# Patient Record
Sex: FEMALE | Race: BLACK OR AFRICAN AMERICAN | NOT HISPANIC OR LATINO | Employment: FULL TIME | RURAL
[De-identification: names, ages, dates, MRNs, and addresses within clinical notes are randomized per-mention and may not be internally consistent; named-entity substitution may affect disease eponyms.]

---

## 2020-04-30 ENCOUNTER — OFFICE VISIT (OUTPATIENT)
Dept: FAMILY MEDICINE CLINIC | Facility: CLINIC | Age: 40
End: 2020-04-30

## 2020-04-30 VITALS
HEART RATE: 73 BPM | SYSTOLIC BLOOD PRESSURE: 124 MMHG | BODY MASS INDEX: 30.09 KG/M2 | HEIGHT: 65 IN | OXYGEN SATURATION: 98 % | WEIGHT: 180.6 LBS | TEMPERATURE: 97.9 F | DIASTOLIC BLOOD PRESSURE: 82 MMHG

## 2020-04-30 DIAGNOSIS — I10 ESSENTIAL HYPERTENSION: Primary | ICD-10-CM

## 2020-04-30 DIAGNOSIS — Z13.220 SCREENING FOR LIPOID DISORDERS: ICD-10-CM

## 2020-04-30 DIAGNOSIS — Z13.1 SCREENING FOR DIABETES MELLITUS: ICD-10-CM

## 2020-04-30 DIAGNOSIS — Z13.29 SCREENING FOR THYROID DISORDER: ICD-10-CM

## 2020-04-30 PROCEDURE — 99203 OFFICE O/P NEW LOW 30 MIN: CPT | Performed by: NURSE PRACTITIONER

## 2020-04-30 RX ORDER — LOSARTAN POTASSIUM 25 MG/1
12.5 TABLET ORAL DAILY
Qty: 45 TABLET | Refills: 1 | Status: SHIPPED | OUTPATIENT
Start: 2020-04-30 | End: 2020-10-29

## 2020-04-30 RX ORDER — ACETAMINOPHEN AND CODEINE PHOSPHATE 120; 12 MG/5ML; MG/5ML
1 SOLUTION ORAL DAILY
COMMUNITY
Start: 2020-04-02 | End: 2022-05-24 | Stop reason: SDUPTHER

## 2020-04-30 NOTE — PATIENT INSTRUCTIONS
"Hypertension, Adult  High blood pressure (hypertension) is when the force of blood pumping through the arteries is too strong. The arteries are the blood vessels that carry blood from the heart throughout the body. Hypertension forces the heart to work harder to pump blood and may cause arteries to become narrow or stiff. Untreated or uncontrolled hypertension can cause a heart attack, heart failure, a stroke, kidney disease, and other problems.  A blood pressure reading consists of a higher number over a lower number. Ideally, your blood pressure should be below 120/80. The first (\"top\") number is called the systolic pressure. It is a measure of the pressure in your arteries as your heart beats. The second (\"bottom\") number is called the diastolic pressure. It is a measure of the pressure in your arteries as the heart relaxes.  What are the causes?  The exact cause of this condition is not known. There are some conditions that result in or are related to high blood pressure.  What increases the risk?  Some risk factors for high blood pressure are under your control. The following factors may make you more likely to develop this condition:  · Smoking.  · Having type 2 diabetes mellitus, high cholesterol, or both.  · Not getting enough exercise or physical activity.  · Being overweight.  · Having too much fat, sugar, calories, or salt (sodium) in your diet.  · Drinking too much alcohol.  Some risk factors for high blood pressure may be difficult or impossible to change. Some of these factors include:  · Having chronic kidney disease.  · Having a family history of high blood pressure.  · Age. Risk increases with age.  · Race. You may be at higher risk if you are .  · Gender. Men are at higher risk than women before age 45. After age 65, women are at higher risk than men.  · Having obstructive sleep apnea.  · Stress.  What are the signs or symptoms?  High blood pressure may not cause symptoms. Very high " blood pressure (hypertensive crisis) may cause:  · Headache.  · Anxiety.  · Shortness of breath.  · Nosebleed.  · Nausea and vomiting.  · Vision changes.  · Severe chest pain.  · Seizures.  How is this diagnosed?  This condition is diagnosed by measuring your blood pressure while you are seated, with your arm resting on a flat surface, your legs uncrossed, and your feet flat on the floor. The cuff of the blood pressure monitor will be placed directly against the skin of your upper arm at the level of your heart. It should be measured at least twice using the same arm. Certain conditions can cause a difference in blood pressure between your right and left arms.  Certain factors can cause blood pressure readings to be lower or higher than normal for a short period of time:  · When your blood pressure is higher when you are in a health care provider's office than when you are at home, this is called white coat hypertension. Most people with this condition do not need medicines.  · When your blood pressure is higher at home than when you are in a health care provider's office, this is called masked hypertension. Most people with this condition may need medicines to control blood pressure.  If you have a high blood pressure reading during one visit or you have normal blood pressure with other risk factors, you may be asked to:  · Return on a different day to have your blood pressure checked again.  · Monitor your blood pressure at home for 1 week or longer.  If you are diagnosed with hypertension, you may have other blood or imaging tests to help your health care provider understand your overall risk for other conditions.  How is this treated?  This condition is treated by making healthy lifestyle changes, such as eating healthy foods, exercising more, and reducing your alcohol intake. Your health care provider may prescribe medicine if lifestyle changes are not enough to get your blood pressure under control, and  if:  · Your systolic blood pressure is above 130.  · Your diastolic blood pressure is above 80.  Your personal target blood pressure may vary depending on your medical conditions, your age, and other factors.  Follow these instructions at home:  Eating and drinking    · Eat a diet that is high in fiber and potassium, and low in sodium, added sugar, and fat. An example eating plan is called the DASH (Dietary Approaches to Stop Hypertension) diet. To eat this way:  ? Eat plenty of fresh fruits and vegetables. Try to fill one half of your plate at each meal with fruits and vegetables.  ? Eat whole grains, such as whole-wheat pasta, brown rice, or whole-grain bread. Fill about one fourth of your plate with whole grains.  ? Eat or drink low-fat dairy products, such as skim milk or low-fat yogurt.  ? Avoid fatty cuts of meat, processed or cured meats, and poultry with skin. Fill about one fourth of your plate with lean proteins, such as fish, chicken without skin, beans, eggs, or tofu.  ? Avoid pre-made and processed foods. These tend to be higher in sodium, added sugar, and fat.  · Reduce your daily sodium intake. Most people with hypertension should eat less than 1,500 mg of sodium a day.  · Do not drink alcohol if:  ? Your health care provider tells you not to drink.  ? You are pregnant, may be pregnant, or are planning to become pregnant.  · If you drink alcohol:  ? Limit how much you use to:  § 0-1 drink a day for women.  § 0-2 drinks a day for men.  ? Be aware of how much alcohol is in your drink. In the [place] one drink equals one 12 oz bottle of beer (355 mL), one 5 oz glass of wine (148 mL), or one 1½ oz glass of hard liquor (44 mL).  Lifestyle    · Work with your health care provider to maintain a healthy body weight or to lose weight. Ask what an ideal weight is for you.  · Get at least 30 minutes of exercise most days of the week. Activities may include walking, swimming, or biking.  · Include exercise to  strengthen your muscles (resistance exercise), such as Pilates or lifting weights, as part of your weekly exercise routine. Try to do these types of exercises for 30 minutes at least 3 days a week.  · Do not use any products that contain nicotine or tobacco, such as cigarettes, e-cigarettes, and chewing tobacco. If you need help quitting, ask your health care provider.  · Monitor your blood pressure at home as told by your health care provider.  · Keep all follow-up visits as told by your health care provider. This is important.  Medicines  · Take over-the-counter and prescription medicines only as told by your health care provider. Follow directions carefully. Blood pressure medicines must be taken as prescribed.  · Do not skip doses of blood pressure medicine. Doing this puts you at risk for problems and can make the medicine less effective.  · Ask your health care provider about side effects or reactions to medicines that you should watch for.  Contact a health care provider if you:  · Think you are having a reaction to a medicine you are taking.  · Have headaches that keep coming back (recurring).  · Feel dizzy.  · Have swelling in your ankles.  · Have trouble with your vision.  Get help right away if you:  · Develop a severe headache or confusion.  · Have unusual weakness or numbness.  · Feel faint.  · Have severe pain in your chest or abdomen.  · Vomit repeatedly.  · Have trouble breathing.  Summary  · Hypertension is when the force of blood pumping through your arteries is too strong. If this condition is not controlled, it may put you at risk for serious complications.  · Your personal target blood pressure may vary depending on your medical conditions, your age, and other factors. For most people, a normal blood pressure is less than 120/80.  · Hypertension is treated with lifestyle changes, medicines, or a combination of both. Lifestyle changes include losing weight, eating a healthy, low-sodium diet,  "exercising more, and limiting alcohol.  This information is not intended to replace advice given to you by your health care provider. Make sure you discuss any questions you have with your health care provider.  Document Released: 12/18/2006 Document Revised: 08/28/2019 Document Reviewed: 08/28/2019  [employer] Interactive Patient Education © 2020 [employer]      Managing Your Hypertension  Hypertension is commonly called high blood pressure. This is when the force of your blood pressing against the walls of your arteries is too strong. Arteries are blood vessels that carry blood from your heart throughout your body. Hypertension forces the heart to work harder to pump blood, and may cause the arteries to become narrow or stiff. Having untreated or uncontrolled hypertension can cause heart attack, stroke, kidney disease, and other problems.  What are blood pressure readings?  A blood pressure reading consists of a higher number over a lower number. Ideally, your blood pressure should be below 120/80. The first (\"top\") number is called the systolic pressure. It is a measure of the pressure in your arteries as your heart beats. The second (\"bottom\") number is called the diastolic pressure. It is a measure of the pressure in your arteries as the heart relaxes.  What does my blood pressure reading mean?  Blood pressure is classified into four stages. Based on your blood pressure reading, your health care provider may use the following stages to determine what type of treatment you need, if any. Systolic pressure and diastolic pressure are measured in a unit called mm Hg.  Normal  · Systolic pressure: below 120.  · Diastolic pressure: below 80.  Elevated  · Systolic pressure: 120-129.  · Diastolic pressure: below 80.  Hypertension stage 1  · Systolic pressure: 130-139.  · Diastolic pressure: 80-89.  Hypertension stage 2  · Systolic pressure: 140 or above.  · Diastolic pressure: 90 or above.  What health risks are " associated with hypertension?  Managing your hypertension is an important responsibility. Uncontrolled hypertension can lead to:  · A heart attack.  · A stroke.  · A weakened blood vessel (aneurysm).  · Heart failure.  · Kidney damage.  · Eye damage.  · Metabolic syndrome.  · Memory and concentration problems.  What changes can I make to manage my hypertension?  Hypertension can be managed by making lifestyle changes and possibly by taking medicines. Your health care provider will help you make a plan to bring your blood pressure within a normal range.  Eating and drinking    · Eat a diet that is high in fiber and potassium, and low in salt (sodium), added sugar, and fat. An example eating plan is called the DASH (Dietary Approaches to Stop Hypertension) diet. To eat this way:  ? Eat plenty of fresh fruits and vegetables. Try to fill half of your plate at each meal with fruits and vegetables.  ? Eat whole grains, such as whole wheat pasta, brown rice, or whole grain bread. Fill about one quarter of your plate with whole grains.  ? Eat low-fat diary products.  ? Avoid fatty cuts of meat, processed or cured meats, and poultry with skin. Fill about one quarter of your plate with lean proteins such as fish, chicken without skin, beans, eggs, and tofu.  ? Avoid premade and processed foods. These tend to be higher in sodium, added sugar, and fat.  · Reduce your daily sodium intake. Most people with hypertension should eat less than 1,500 mg of sodium a day.  · Limit alcohol intake to no more than 1 drink a day for nonpregnant women and 2 drinks a day for men. One drink equals 12 oz of beer, 5 oz of wine, or 1½ oz of hard liquor.  Lifestyle  · Work with your health care provider to maintain a healthy body weight, or to lose weight. Ask what an ideal weight is for you.  · Get at least 30 minutes of exercise that causes your heart to beat faster (aerobic exercise) most days of the week. Activities may include walking,  swimming, or biking.  · Include exercise to strengthen your muscles (resistance exercise), such as weight lifting, as part of your weekly exercise routine. Try to do these types of exercises for 30 minutes at least 3 days a week.  · Do not use any products that contain nicotine or tobacco, such as cigarettes and e-cigarettes. If you need help quitting, ask your health care provider.  · Control any long-term (chronic) conditions you have, such as high cholesterol or diabetes.  Monitoring  · Monitor your blood pressure at home as told by your health care provider. Your personal target blood pressure may vary depending on your medical conditions, your age, and other factors.  · Have your blood pressure checked regularly, as often as told by your health care provider.  Working with your health care provider  · Review all the medicines you take with your health care provider because there may be side effects or interactions.  · Talk with your health care provider about your diet, exercise habits, and other lifestyle factors that may be contributing to hypertension.  · Visit your health care provider regularly. Your health care provider can help you create and adjust your plan for managing hypertension.  Will I need medicine to control my blood pressure?  Your health care provider may prescribe medicine if lifestyle changes are not enough to get your blood pressure under control, and if:  · Your systolic blood pressure is 130 or higher.  · Your diastolic blood pressure is 80 or higher.  Take medicines only as told by your health care provider. Follow the directions carefully. Blood pressure medicines must be taken as prescribed. The medicine does not work as well when you skip doses. Skipping doses also puts you at risk for problems.  Contact a health care provider if:  · You think you are having a reaction to medicines you have taken.  · You have repeated (recurrent) headaches.  · You feel dizzy.  · You have swelling in  your ankles.  · You have trouble with your vision.  Get help right away if:  · You develop a severe headache or confusion.  · You have unusual weakness or numbness, or you feel faint.  · You have severe pain in your chest or abdomen.  · You vomit repeatedly.  · You have trouble breathing.  Summary  · Hypertension is when the force of blood pumping through your arteries is too strong. If this condition is not controlled, it may put you at risk for serious complications.  · Your personal target blood pressure may vary depending on your medical conditions, your age, and other factors. For most people, a normal blood pressure is less than 120/80.  · Hypertension is managed by lifestyle changes, medicines, or both. Lifestyle changes include weight loss, eating a healthy, low-sodium diet, exercising more, and limiting alcohol.  This information is not intended to replace advice given to you by your health care provider. Make sure you discuss any questions you have with your health care provider.  Document Released: 09/11/2013 Document Revised: 11/15/2017 Document Reviewed: 11/15/2017  "[employer]" Interactive Patient Education © 2020 "[employer] Inc[employer]

## 2020-04-30 NOTE — PROGRESS NOTES
Subjective   Blanca Ventura is a 39 y.o. female.     Patient presents today to establish care.  She reports a medical history significant for hypertension.  She reports that she was on indapamide previously.  She did not like this medication because it caused her to urinate frequently.  She is interested in something different to treat her blood pressure possible.  She reports she has a significant family history of hypertension, heart attack, and stroke.  Her mother had a massive stroke.  She reports that her brother also had a massive stroke.  Her surgical history is significant for 4 C-sections and hernia repair.  She is not sure when she had her last routine fasting labs.  She is currently on progesterone only oral contraceptives.  Her blood pressure is fairly well-controlled today in the office.  She does make note that it occasionally she will experience some discomfort in her chest and palpitations.  She believes that this is when her blood pressure is elevated.  She is otherwise without any other new complaints today in the office.    Hypertension   This is a chronic problem. The current episode started more than 1 year ago. The problem is unchanged. The problem is resistant. Associated symptoms include anxiety, chest pain, headaches, malaise/fatigue and palpitations. Pertinent negatives include no blurred vision, neck pain, orthopnea, peripheral edema, PND, shortness of breath or sweats. Agents associated with hypertension include oral contraceptives. Risk factors for coronary artery disease include family history, obesity and stress. Past treatments include diuretics. Current antihypertension treatment includes nothing. The current treatment provides mild improvement. Compliance problems include diet, exercise and medication side effects.    Obesity   This is a chronic problem. The current episode started more than 1 year ago. The problem occurs constantly. The problem has been unchanged.  Associated symptoms include chest pain and headaches. Pertinent negatives include no abdominal pain, anorexia, arthralgias, change in bowel habit, chills, congestion, coughing, diaphoresis, fatigue, fever, joint swelling, myalgias, nausea, neck pain, numbness, rash, sore throat, swollen glands, urinary symptoms, vertigo, visual change, vomiting or weakness. The symptoms are aggravated by drinking and eating. The treatment provided no relief.        The following portions of the patient's history were reviewed and updated as appropriate: allergies, current medications, past family history, past medical history, past social history, past surgical history and problem list.    Review of Systems   Constitutional: Positive for malaise/fatigue. Negative for chills, diaphoresis, fatigue and fever.   HENT: Negative for congestion, sore throat and swollen glands.    Eyes: Negative.  Negative for blurred vision.   Respiratory: Negative.  Negative for cough and shortness of breath.    Cardiovascular: Positive for chest pain and palpitations. Negative for orthopnea and PND.   Gastrointestinal: Negative.  Negative for abdominal pain, anorexia, change in bowel habit, nausea and vomiting.   Musculoskeletal: Negative.  Negative for arthralgias, joint swelling, myalgias and neck pain.   Skin: Negative.  Negative for rash.   Allergic/Immunologic: Negative.    Neurological: Negative for vertigo, weakness and numbness.   Hematological: Negative.    Psychiatric/Behavioral: Negative.        Objective   Physical Exam   Constitutional: She is oriented to person, place, and time. She appears well-developed and well-nourished. No distress. She is obese.  HENT:   Head: Normocephalic.   Eyes: Pupils are equal, round, and reactive to light. Conjunctivae and EOM are normal.   Neck: Normal range of motion. Neck supple.   Cardiovascular: Normal rate, regular rhythm and normal heart sounds. Exam reveals no gallop and no friction rub.   No murmur  heard.  Pulmonary/Chest: Effort normal and breath sounds normal. No stridor. No respiratory distress. She has no wheezes. She has no rales. She exhibits no tenderness.   Musculoskeletal: Normal range of motion.   Neurological: She is alert and oriented to person, place, and time.   Skin: Skin is warm and dry. Capillary refill takes less than 2 seconds. No rash noted. She is not diaphoretic. No erythema. No pallor.   Psychiatric: She has a normal mood and affect. Her behavior is normal. Judgment and thought content normal.   Nursing note and vitals reviewed.        Assessment/Plan   Blanca was seen today for establish care.    Diagnoses and all orders for this visit:    Essential hypertension  -     losartan (Cozaar) 25 MG tablet; Take 0.5 tablets by mouth Daily.  -     CBC & Differential  -     Comprehensive Metabolic Panel  -     Hemoglobin A1c  -     Lipid Panel  -     TSH  -     Vitamin D 25 Hydroxy  -     Microalbumin / Creatinine Urine Ratio - Urine, Clean Catch    Screening for diabetes mellitus  -     CBC & Differential  -     Comprehensive Metabolic Panel  -     Hemoglobin A1c  -     Lipid Panel  -     TSH  -     Vitamin D 25 Hydroxy  -     Microalbumin / Creatinine Urine Ratio - Urine, Clean Catch    Screening for lipoid disorders  -     CBC & Differential  -     Comprehensive Metabolic Panel  -     Hemoglobin A1c  -     Lipid Panel  -     TSH  -     Vitamin D 25 Hydroxy  -     Microalbumin / Creatinine Urine Ratio - Urine, Clean Catch    Screening for thyroid disorder  -     CBC & Differential  -     Comprehensive Metabolic Panel  -     Hemoglobin A1c  -     Lipid Panel  -     TSH  -     Vitamin D 25 Hydroxy  -     Microalbumin / Creatinine Urine Ratio - Urine, Clean Catch               Patient's Body mass index is 30.05 kg/m². BMI is above normal parameters. Recommendations include: educational material.    Fasting labs.  Start on losartan 12.5 mg once daily.  Continue all other current  medications.  Follow up in 1 month for routine follow up.  Follow up sooner for problems/concerns.  Patient verbalized understanding and agreement with plan of care.        This document has been electronically signed by ALEXA Duval on April 30, 2020 15:18    Answers for HPI/ROS submitted by the patient on 4/28/2020   Hypertension  What is the primary reason for your visit?: High Blood Pressure

## 2020-05-06 ENCOUNTER — APPOINTMENT (OUTPATIENT)
Dept: LAB | Facility: HOSPITAL | Age: 40
End: 2020-05-06

## 2020-05-06 LAB
25(OH)D3 SERPL-MCNC: 5.6 NG/ML (ref 30–100)
ALBUMIN SERPL-MCNC: 3.8 G/DL (ref 3.5–5.2)
ALBUMIN UR-MCNC: 1.2 MG/DL
ALBUMIN/GLOB SERPL: 1.2 G/DL
ALP SERPL-CCNC: 82 U/L (ref 39–117)
ALT SERPL W P-5'-P-CCNC: 13 U/L (ref 1–33)
ANION GAP SERPL CALCULATED.3IONS-SCNC: 11.3 MMOL/L (ref 5–15)
AST SERPL-CCNC: 14 U/L (ref 1–32)
BASOPHILS # BLD AUTO: 0.04 10*3/MM3 (ref 0–0.2)
BASOPHILS NFR BLD AUTO: 0.6 % (ref 0–1.5)
BILIRUB SERPL-MCNC: 0.7 MG/DL (ref 0.2–1.2)
BUN BLD-MCNC: 9 MG/DL (ref 6–20)
BUN/CREAT SERPL: 11 (ref 7–25)
CALCIUM SPEC-SCNC: 9.1 MG/DL (ref 8.6–10.5)
CHLORIDE SERPL-SCNC: 103 MMOL/L (ref 98–107)
CHOLEST SERPL-MCNC: 172 MG/DL (ref 0–200)
CO2 SERPL-SCNC: 25.7 MMOL/L (ref 22–29)
CREAT BLD-MCNC: 0.82 MG/DL (ref 0.57–1)
CREAT UR-MCNC: 362.8 MG/DL
DEPRECATED RDW RBC AUTO: 35.9 FL (ref 37–54)
EOSINOPHIL # BLD AUTO: 0.1 10*3/MM3 (ref 0–0.4)
EOSINOPHIL NFR BLD AUTO: 1.6 % (ref 0.3–6.2)
ERYTHROCYTE [DISTWIDTH] IN BLOOD BY AUTOMATED COUNT: 12.4 % (ref 12.3–15.4)
GFR SERPL CREATININE-BSD FRML MDRD: 95 ML/MIN/1.73
GLOBULIN UR ELPH-MCNC: 3.1 GM/DL
GLUCOSE BLD-MCNC: 88 MG/DL (ref 65–99)
HBA1C MFR BLD: 5.08 % (ref 4.8–5.6)
HCT VFR BLD AUTO: 38.1 % (ref 34–46.6)
HDLC SERPL-MCNC: 52 MG/DL (ref 40–60)
HGB BLD-MCNC: 12.2 G/DL (ref 12–15.9)
IMM GRANULOCYTES # BLD AUTO: 0.01 10*3/MM3 (ref 0–0.05)
IMM GRANULOCYTES NFR BLD AUTO: 0.2 % (ref 0–0.5)
LDLC SERPL CALC-MCNC: 111 MG/DL (ref 0–100)
LDLC/HDLC SERPL: 2.13 {RATIO}
LYMPHOCYTES # BLD AUTO: 1.92 10*3/MM3 (ref 0.7–3.1)
LYMPHOCYTES NFR BLD AUTO: 29.9 % (ref 19.6–45.3)
MCH RBC QN AUTO: 25.8 PG (ref 26.6–33)
MCHC RBC AUTO-ENTMCNC: 32 G/DL (ref 31.5–35.7)
MCV RBC AUTO: 80.5 FL (ref 79–97)
MICROALBUMIN/CREAT UR: 3.3 MG/G
MONOCYTES # BLD AUTO: 0.37 10*3/MM3 (ref 0.1–0.9)
MONOCYTES NFR BLD AUTO: 5.8 % (ref 5–12)
NEUTROPHILS # BLD AUTO: 3.98 10*3/MM3 (ref 1.7–7)
NEUTROPHILS NFR BLD AUTO: 61.9 % (ref 42.7–76)
NRBC BLD AUTO-RTO: 0 /100 WBC (ref 0–0.2)
PLATELET # BLD AUTO: 320 10*3/MM3 (ref 140–450)
PMV BLD AUTO: 10.7 FL (ref 6–12)
POTASSIUM BLD-SCNC: 4 MMOL/L (ref 3.5–5.2)
PROT SERPL-MCNC: 6.9 G/DL (ref 6–8.5)
RBC # BLD AUTO: 4.73 10*6/MM3 (ref 3.77–5.28)
SODIUM BLD-SCNC: 140 MMOL/L (ref 136–145)
TRIGL SERPL-MCNC: 47 MG/DL (ref 0–150)
TSH SERPL DL<=0.05 MIU/L-ACNC: 2.1 UIU/ML (ref 0.27–4.2)
VLDLC SERPL-MCNC: 9.4 MG/DL (ref 5–40)
WBC NRBC COR # BLD: 6.42 10*3/MM3 (ref 3.4–10.8)

## 2020-05-06 PROCEDURE — 83036 HEMOGLOBIN GLYCOSYLATED A1C: CPT | Performed by: NURSE PRACTITIONER

## 2020-05-06 PROCEDURE — 82570 ASSAY OF URINE CREATININE: CPT | Performed by: NURSE PRACTITIONER

## 2020-05-06 PROCEDURE — 84443 ASSAY THYROID STIM HORMONE: CPT | Performed by: NURSE PRACTITIONER

## 2020-05-06 PROCEDURE — 80061 LIPID PANEL: CPT | Performed by: NURSE PRACTITIONER

## 2020-05-06 PROCEDURE — 80053 COMPREHEN METABOLIC PANEL: CPT | Performed by: NURSE PRACTITIONER

## 2020-05-06 PROCEDURE — 82043 UR ALBUMIN QUANTITATIVE: CPT | Performed by: NURSE PRACTITIONER

## 2020-05-06 PROCEDURE — 85025 COMPLETE CBC W/AUTO DIFF WBC: CPT | Performed by: NURSE PRACTITIONER

## 2020-05-06 PROCEDURE — 82306 VITAMIN D 25 HYDROXY: CPT | Performed by: NURSE PRACTITIONER

## 2020-05-07 DIAGNOSIS — E55.9 VITAMIN D DEFICIENCY: Primary | ICD-10-CM

## 2020-05-07 RX ORDER — ERGOCALCIFEROL 1.25 MG/1
50000 CAPSULE ORAL WEEKLY
Qty: 12 CAPSULE | Refills: 3 | Status: SHIPPED | OUTPATIENT
Start: 2020-05-07

## 2020-05-08 ENCOUNTER — TELEPHONE (OUTPATIENT)
Dept: FAMILY MEDICINE CLINIC | Facility: CLINIC | Age: 40
End: 2020-05-08

## 2020-05-08 NOTE — TELEPHONE ENCOUNTER
----- Message from Mesfin Elizondo LPN sent at 5/7/2020  4:54 PM CDT -----      ----- Message -----  From: Sarah Peraza APRN  Sent: 5/7/2020   4:17 PM CDT  To: Marleny Gamino MA    Vitamin D is very low.  I am going to send in a once weekly vitamin D supplement to the pharmacy.  Otherwise labs look pretty good.  LDL cholesterol slightly elevated.  Monitor diet closely and avoid greasy/fatty foods.

## 2020-05-26 ENCOUNTER — OFFICE VISIT (OUTPATIENT)
Dept: FAMILY MEDICINE CLINIC | Facility: CLINIC | Age: 40
End: 2020-05-26

## 2020-05-26 VITALS
TEMPERATURE: 99.1 F | DIASTOLIC BLOOD PRESSURE: 80 MMHG | HEART RATE: 71 BPM | OXYGEN SATURATION: 98 % | WEIGHT: 183.9 LBS | RESPIRATION RATE: 20 BRPM | HEIGHT: 65 IN | BODY MASS INDEX: 30.64 KG/M2 | SYSTOLIC BLOOD PRESSURE: 120 MMHG

## 2020-05-26 DIAGNOSIS — Z30.41 ORAL CONTRACEPTIVE USE: ICD-10-CM

## 2020-05-26 DIAGNOSIS — E66.9 OBESITY (BMI 30-39.9): ICD-10-CM

## 2020-05-26 DIAGNOSIS — I10 ESSENTIAL HYPERTENSION: Primary | ICD-10-CM

## 2020-05-26 PROCEDURE — 99214 OFFICE O/P EST MOD 30 MIN: CPT | Performed by: NURSE PRACTITIONER

## 2020-06-20 PROBLEM — I10 ESSENTIAL HYPERTENSION: Status: ACTIVE | Noted: 2020-06-20

## 2020-06-20 PROBLEM — Z30.41 ORAL CONTRACEPTIVE USE: Status: ACTIVE | Noted: 2020-06-20

## 2020-06-20 PROBLEM — E66.9 OBESITY (BMI 30-39.9): Status: ACTIVE | Noted: 2020-06-20

## 2020-06-21 NOTE — PROGRESS NOTES
Subjective   Blanca Ventura is a 39 y.o. female.     She presents today for her routine follow-up on hypertension.  She reports that she is tolerating her blood pressure medication without side effects.  She would like to continue on this.  Her blood pressures well controlled today in the office.  Her weight has remained fairly stable since her last office visit with me.  She has gained 3 pounds.  She is otherwise without any other new complaints today in the office.    Hypertension   This is a chronic problem. The current episode started more than 1 year ago. The problem is unchanged. The problem is resistant. Associated symptoms include anxiety. Pertinent negatives include no blurred vision, neck pain, orthopnea, peripheral edema, PND, shortness of breath or sweats. Agents associated with hypertension include oral contraceptives. Risk factors for coronary artery disease include family history, obesity and stress. Past treatments include diuretics. Current antihypertension treatment includes nothing. The current treatment provides mild improvement. Compliance problems include diet, exercise and medication side effects.    Obesity   This is a chronic problem. The current episode started more than 1 year ago. The problem occurs constantly. The problem has been unchanged. Pertinent negatives include no abdominal pain, anorexia, arthralgias, change in bowel habit, chills, congestion, coughing, diaphoresis, fatigue, fever, joint swelling, myalgias, nausea, neck pain, numbness, rash, sore throat, swollen glands, urinary symptoms, vertigo, visual change, vomiting or weakness. The symptoms are aggravated by drinking and eating. The treatment provided no relief.        The following portions of the patient's history were reviewed and updated as appropriate: allergies, current medications, past family history, past medical history, past social history, past surgical history and problem list.    Review of Systems      Constitutional: Negative.  Negative for chills, diaphoresis, fatigue and fever.   HENT: Negative.  Negative for congestion, sore throat and swollen glands.    Eyes: Negative.  Negative for blurred vision.   Respiratory: Negative.  Negative for cough and shortness of breath.    Cardiovascular: Negative.  Negative for orthopnea and PND.   Gastrointestinal: Negative.  Negative for abdominal pain, anorexia, change in bowel habit, nausea and vomiting.   Musculoskeletal: Negative.  Negative for arthralgias, joint swelling, myalgias and neck pain.   Skin: Negative.  Negative for rash.   Allergic/Immunologic: Negative.    Neurological: Negative.  Negative for vertigo, weakness and numbness.   Hematological: Negative.    Psychiatric/Behavioral: Negative.        Objective   Physical Exam   Constitutional: She is oriented to person, place, and time. Vital signs are normal. She appears well-developed and well-nourished. No distress. She is obese.  HENT:   Head: Normocephalic.   Right Ear: External ear normal.   Left Ear: External ear normal.   Nose: Nose normal.   Mouth/Throat: Oropharynx is clear and moist. No oropharyngeal exudate.   Eyes: Pupils are equal, round, and reactive to light. Conjunctivae and EOM are normal. Right eye exhibits no discharge. Left eye exhibits no discharge.   Neck: Normal range of motion. Neck supple. No tracheal deviation present. No thyromegaly present.   Cardiovascular: Normal rate, regular rhythm and normal heart sounds. Exam reveals no gallop and no friction rub.   No murmur heard.  Pulmonary/Chest: Effort normal and breath sounds normal. No respiratory distress. She has no wheezes. She has no rales. She exhibits no tenderness.   Musculoskeletal: Normal range of motion.   Lymphadenopathy:     She has no cervical adenopathy.   Neurological: She is alert and oriented to person, place, and time.   Skin: Skin is warm and dry. Capillary refill takes less than 2 seconds. No rash noted. She is not  diaphoretic. No erythema. No pallor.   Psychiatric: She has a normal mood and affect. Her behavior is normal. Judgment and thought content normal.   Nursing note and vitals reviewed.        Assessment/Plan   Blanca was seen today for follow-up and hypertension.    Diagnoses and all orders for this visit:    Essential hypertension    Obesity (BMI 30-39.9)    Oral contraceptive use               Patient's Body mass index is 30.6 kg/m². BMI is above normal parameters. Recommendations include: educational material.      Continue current medications.  Follow up in 6 months for routine follow up.  Follow up sooner for problems/concerns.  Patient verbalized understanding and agreement with plan of care.        This document has been electronically signed by ALEXA Duval on June 20, 2020 19:30    Answers for HPI/ROS submitted by the patient on 5/19/2020   What is the primary reason for your visit?: Other  Please describe your symptoms.: Follow up  Have you had these symptoms before?: No  How long have you been having these symptoms?: 1-4 days  Please list any medications you are currently taking for this condition.: Birth control, Blood pressure med, Vit d  Please describe any probable cause for these symptoms. : Na

## 2020-06-25 ENCOUNTER — OFFICE VISIT (OUTPATIENT)
Dept: FAMILY MEDICINE CLINIC | Facility: CLINIC | Age: 40
End: 2020-06-25

## 2020-06-25 VITALS
OXYGEN SATURATION: 98 % | WEIGHT: 180.5 LBS | HEART RATE: 72 BPM | RESPIRATION RATE: 20 BRPM | SYSTOLIC BLOOD PRESSURE: 116 MMHG | DIASTOLIC BLOOD PRESSURE: 80 MMHG | BODY MASS INDEX: 30.07 KG/M2 | HEIGHT: 65 IN | TEMPERATURE: 97.7 F

## 2020-06-25 DIAGNOSIS — B08.4 HAND, FOOT AND MOUTH DISEASE: Primary | ICD-10-CM

## 2020-06-25 DIAGNOSIS — E66.9 OBESITY (BMI 30-39.9): ICD-10-CM

## 2020-06-25 PROCEDURE — 96372 THER/PROPH/DIAG INJ SC/IM: CPT | Performed by: NURSE PRACTITIONER

## 2020-06-25 PROCEDURE — 99214 OFFICE O/P EST MOD 30 MIN: CPT | Performed by: NURSE PRACTITIONER

## 2020-06-25 RX ORDER — DEXAMETHASONE SODIUM PHOSPHATE 10 MG/ML
10 INJECTION INTRAMUSCULAR; INTRAVENOUS ONCE
Status: COMPLETED | OUTPATIENT
Start: 2020-06-25 | End: 2020-06-25

## 2020-06-25 RX ADMIN — DEXAMETHASONE SODIUM PHOSPHATE 10 MG: 10 INJECTION INTRAMUSCULAR; INTRAVENOUS at 09:24

## 2020-06-25 NOTE — PROGRESS NOTES
"Nilsa Ventura is a 39 y.o. female.     [facility] Same Day Appointment    PCP: ALEXA Forde    CC: \"rash on hands/feet\"    Working from home.  Kids have not been ill.  Has been out in the public to some stores a few times.      Rash   This is a new problem. Episode onset: x 2 days. The problem has been gradually worsening since onset. Location: bilateral hands/feet. The rash is characterized by blistering, itchiness and pain. It is unknown if there was an exposure to a precipitant. Associated symptoms include fatigue. Pertinent negatives include no anorexia, congestion, cough, diarrhea, eye pain, facial edema, fever, joint pain, nail changes, rhinorrhea, shortness of breath, sore throat or vomiting. Treatments tried: anti itch cream. The treatment provided mild relief. Her past medical history is significant for varicella. There is no history of eczema.        The following portions of the patient's history were reviewed and updated as appropriate: allergies, current medications, past medical history, past social history, past surgical history and problem list.    Review of Systems   Constitutional: Positive for chills and fatigue. Negative for appetite change and fever.   HENT: Negative.  Negative for congestion, rhinorrhea and sore throat.    Eyes: Negative.  Negative for pain.   Respiratory: Negative.  Negative for cough and shortness of breath.    Cardiovascular: Negative.    Gastrointestinal: Negative for anorexia, diarrhea, nausea and vomiting.   Genitourinary: Negative for difficulty urinating.   Musculoskeletal: Negative for joint pain and myalgias.   Skin: Positive for rash. Negative for nail changes.   Neurological: Negative for dizziness and headache.     /80 (BP Location: Left arm, Patient Position: Sitting, Cuff Size: Adult)   Pulse 72   Temp 97.7 °F (36.5 °C) (Temporal)   Resp 20   Ht 165.1 cm (65\")   Wt 81.9 kg (180 lb 8 oz)   LMP  (LMP Unknown)   SpO2 98%   Breastfeeding " No   BMI 30.04 kg/m²     Objective   Physical Exam   Constitutional: She is oriented to person, place, and time. She appears well-developed and well-nourished. No distress.   HENT:   Head: Normocephalic and atraumatic.   Right Ear: Tympanic membrane and ear canal normal.   Left Ear: Tympanic membrane and ear canal normal.   Nose: Nose normal. Right sinus exhibits no maxillary sinus tenderness and no frontal sinus tenderness. Left sinus exhibits no maxillary sinus tenderness and no frontal sinus tenderness.   Mouth/Throat: Uvula is midline, oropharynx is clear and moist and mucous membranes are normal.   Eyes: Conjunctivae are normal. Right eye exhibits no discharge. Left eye exhibits no discharge.   Cardiovascular: Normal rate and regular rhythm.   Pulmonary/Chest: Effort normal and breath sounds normal. She has no wheezes. She has no rales.   Lymphadenopathy:     She has cervical adenopathy (shotty, non tender).   Neurological: She is alert and oriented to person, place, and time.   Skin: Skin is warm and dry. Rash noted. Vesicular rash: blisters.        Nursing note and vitals reviewed.    No results found for this or any previous visit (from the past 24 hour(s)).  No Images in the past 120 days found..      Assessment/Plan   Blanca was seen today for rash.    Diagnoses and all orders for this visit:    Hand, foot and mouth disease  Comments:  suspect  Orders:  -     dexamethasone (DECADRON) injection 10 mg  -     triamcinolone (KENALOG) 0.1 % ointment; Apply  topically to the appropriate area as directed 3 (Three) Times a Day.    Obesity (BMI 30-39.9)    Suspect HFM disease based on rash and presence of chills/fatigue.  Handout provided.   Symptomatic treatment provided  Decadron 10 mg IM x 1 in office to help with itching  Rx for Triamcinalone ointment provided.  May continue with anti itch lotion/cream as well.   Cool compresses as needed.  Avoid getting too hot.      Patient's Body mass index is 30.04 kg/m².  BMI is above normal parameters. Recommendations include: referral to primary care.    RTW: 6/29/2020    See PCP or RTC if symptoms persist/worsen  See PCP for routine f/u visit and management of chronic medical conditions      This document has been electronically signed by ALEXA Mendez on June 25, 2020 09:29,.

## 2020-06-25 NOTE — PATIENT INSTRUCTIONS
Hand, Foot, and Mouth Disease, Adult  Hand, foot, and mouth disease is a common viral illness. It happens mainly in children who are younger than 10 years old, but adolescents and adults may also get it. The illness often causes:  · Sore throat.  · Sores in the mouth.  · Fever.  · Rash on the hands and feet.  Usually, this condition is not serious. Most people get better within 1-2 weeks.  What are the causes?  This condition is usually caused by a group of viruses called enteroviruses. The disease can spread from person to person (is contagious). A person is most contagious during the first week of the illness. The infection spreads through direct contact with:  · Nose discharge of an infected person.  · Throat discharge of an infected person.  · Stool (feces) of an infected person.  What are the signs or symptoms?  Symptoms of this condition include:  · Small sores in the mouth. These may cause pain.  · A rash on the hands and feet, and sometimes on the buttocks. The rash may also occur on the arms, legs, or other areas of the body. The rash may look like small red bumps or sores and may have blisters.  · Fever.  · Body aches or headaches.  · Irritability.  · Decreased appetite.  How is this diagnosed?  This condition can usually be diagnosed with a physical exam in which your health care provider will look at your rash and mouth sores. Tests are usually not needed. In some cases, a stool (feces) sample or a throat swab may be taken to check for the virus or for other infections.  How is this treated?  In most cases, no treatment is needed. People usually get better within 2 weeks without treatment. To help relieve pain or fever, your health care provider may recommend over-the-counter medicines such as ibuprofen or acetaminophen. To help relieve discomfort from mouth sores, your health care provider may recommend using:  · Solutions that are rinsed in the mouth.  · Pain-relieving gel that is applied to the sores  (topical gel).  · Antacid medicine.  Follow these instructions at home:  Managing pain and discomfort  · Rinse your mouth with a salt-water mixture 3-4 times a day or as needed. To make a salt-water mixture, completely dissolve ½-1 tsp of salt in 1 cup of warm water. This can help to reduce pain from the mouth sores. Your health care provider may also recommend other rinse solutions to treat mouth sores.  · To relieve discomfort when you are eating:  ? Try combinations of foods to see what you can tolerate. Aim for a balanced diet.  ? Eat soft foods. These may be easier to swallow.  ? Avoid foods and drinks that are salty, spicy, or acidic.  ? Avoid alcohol.  ? Try cold food and drinks, such as water, milk, milkshakes, frozen ice pops, slushies, and sherbets. Low-calorie sport drinks are good choices for staying hydrated.  General instructions    · Return to your normal activities as told by your health care provider. Ask your health care provider what activities are safe for you.  · Take or apply over-the-counter and prescription medicines only as told by your health care provider.  · Wash your hands often with soap and water. If soap and water are not available, use hand .  · Stay away from work, schools, or other group settings during the first few days of the illness, or until your fever is gone.  · Keep all follow-up visits as told by your health care provider. This is important.  Contact a health care provider if:  · Your symptoms get worse or do not improve within 2 weeks.  · You have pain that does not get better with medicine.  · You feel very irritable.  · You have trouble swallowing.  · You develop sores or blisters on your lips or outside of your mouth.  · You have a fever for more than 3 days.  Get help right away if:  · You develop signs of severe dehydration, such as:  ? Decreased urination. This means urinating only very small amounts or urinating fewer than 3 times in a 24-hour  period.  ? Urine that is very dark.  ? Dry mouth, tongue, or lips.  ? Decreased tears or sunken eyes.  ? Dry skin.  ? Rapid breathing.  ? Decreased activity or being very sleepy.  ? Pale skin.  ? Fingertips taking longer than 2 seconds to turn pink after a gentle squeeze.  ? Weight loss.  · You have a severe headache.  · You have a stiff neck.  · You experience changes in your behavior.  · You have chest pain.  · You have trouble breathing.  Summary  · Hand, foot, and mouth disease is a common viral illness.  · This disease can spread from person to person (is contagious).  · The illness often causes a sore throat, sores in the mouth, fever, and a rash on the hands and feet.  · Typically, no treatment is needed for this condition. People usually get better within 2 weeks without treatment.  · Get help right away if you develop signs of severe dehydration.  This information is not intended to replace advice given to you by your health care provider. Make sure you discuss any questions you have with your health care provider.  Document Released: 02/05/2018 Document Revised: 04/09/2020 Document Reviewed: 02/05/2018  Elsevier Patient Education © 2020 [employer]

## 2020-07-10 ENCOUNTER — TELEPHONE (OUTPATIENT)
Dept: FAMILY MEDICINE CLINIC | Facility: CLINIC | Age: 40
End: 2020-07-10

## 2020-07-10 NOTE — TELEPHONE ENCOUNTER
Tried calling to make an appointment.  No answer left voice message stating that she will need to make an appointment to discuss weight loss options.  HUB to read      ----- Message from ALEXA Duval sent at 7/9/2020  8:07 PM CDT -----  Regarding: FW: Non-Urgent Medical Question  Contact: [phone]  She will have to make an appt to discuss weight loss options.      ----- Message -----  From: Mesfin Elizondo LPN  Sent: 7/9/2020  12:02 PM CDT  To: ALEXA Duval  Subject: FW: Non-Urgent Medical Question                      ----- Message -----  From: Blanca Ventura  Sent: 7/9/2020  11:13 AM CDT  To: [facility]  Subject: Non-Urgent Medical Question                      Hello, I was wondering if I get get prescribed a diet bill, I have been researching several.

## 2020-10-29 DIAGNOSIS — I10 ESSENTIAL HYPERTENSION: ICD-10-CM

## 2020-10-29 RX ORDER — LOSARTAN POTASSIUM 25 MG/1
TABLET ORAL
Qty: 45 TABLET | Refills: 1 | Status: SHIPPED | OUTPATIENT
Start: 2020-10-29

## 2022-05-24 ENCOUNTER — OFFICE VISIT (OUTPATIENT)
Dept: FAMILY MEDICINE CLINIC | Facility: CLINIC | Age: 42
End: 2022-05-24

## 2022-05-24 VITALS
RESPIRATION RATE: 20 BRPM | DIASTOLIC BLOOD PRESSURE: 70 MMHG | SYSTOLIC BLOOD PRESSURE: 120 MMHG | HEIGHT: 63 IN | HEART RATE: 65 BPM | OXYGEN SATURATION: 95 % | TEMPERATURE: 98 F | BODY MASS INDEX: 29.59 KG/M2 | WEIGHT: 167 LBS

## 2022-05-24 DIAGNOSIS — Z13.1 SCREENING FOR DIABETES MELLITUS: ICD-10-CM

## 2022-05-24 DIAGNOSIS — R53.82 CHRONIC FATIGUE: ICD-10-CM

## 2022-05-24 DIAGNOSIS — E66.3 OVERWEIGHT (BMI 25.0-29.9): ICD-10-CM

## 2022-05-24 DIAGNOSIS — Z12.31 ENCOUNTER FOR SCREENING MAMMOGRAM FOR BREAST CANCER: ICD-10-CM

## 2022-05-24 DIAGNOSIS — Z00.00 WELL ADULT EXAM: Primary | ICD-10-CM

## 2022-05-24 DIAGNOSIS — Z30.41 ORAL CONTRACEPTIVE USE: ICD-10-CM

## 2022-05-24 DIAGNOSIS — Z13.29 SCREENING FOR THYROID DISORDER: ICD-10-CM

## 2022-05-24 DIAGNOSIS — Z11.59 ENCOUNTER FOR HEPATITIS C SCREENING TEST FOR LOW RISK PATIENT: ICD-10-CM

## 2022-05-24 DIAGNOSIS — R06.83 SNORING: ICD-10-CM

## 2022-05-24 DIAGNOSIS — Z13.220 SCREENING FOR LIPOID DISORDERS: ICD-10-CM

## 2022-05-24 PROCEDURE — 99396 PREV VISIT EST AGE 40-64: CPT | Performed by: NURSE PRACTITIONER

## 2022-05-24 RX ORDER — HYDROCORTISONE ACETATE SUPPOSITORY 30 MG/1
1 SUPPOSITORY RECTAL 3 TIMES DAILY
Qty: 12 EACH | Refills: 11 | Status: SHIPPED | OUTPATIENT
Start: 2022-05-24

## 2022-05-24 RX ORDER — ACETAMINOPHEN AND CODEINE PHOSPHATE 120; 12 MG/5ML; MG/5ML
1 SOLUTION ORAL DAILY
Qty: 84 TABLET | Refills: 3 | Status: SHIPPED | OUTPATIENT
Start: 2022-05-24

## 2022-06-13 ENCOUNTER — PATIENT MESSAGE (OUTPATIENT)
Dept: FAMILY MEDICINE CLINIC | Facility: CLINIC | Age: 42
End: 2022-06-13

## 2022-06-13 DIAGNOSIS — E78.5 DYSLIPIDEMIA: Primary | ICD-10-CM

## 2022-06-13 NOTE — PROGRESS NOTES
Subjective   Blanca Ventura is a 41 y.o. female.     She presents today for routine follow-up on current medical problems and annual wellness visit.  It is been quite sometime since we last saw her in the office.  She is due for routine fasting labs.  She is also due for screening mammogram.  She reports that she is tolerating her oral contraceptive without side effect.  She would like a refill on this today in the office.  She has been struggling with some hemorrhoid symptoms recently.  She would like something to help with this if possible.  Her blood pressure is well controlled today in the office.  She does struggle with chronic fatigue and snoring.  She would like a referral for sleep study.  She is otherwise without any other new complaints today in the office.       The following portions of the patient's history were reviewed and updated as appropriate: allergies, current medications, past family history, past medical history, past social history, past surgical history and problem list.    Review of Systems   Constitutional: Positive for fatigue.   HENT: Negative.    Eyes: Negative.    Respiratory: Negative.    Cardiovascular: Negative.    Gastrointestinal: Negative.    Musculoskeletal: Negative.    Skin: Negative.    Allergic/Immunologic: Negative.    Neurological: Negative.    Hematological: Negative.    Psychiatric/Behavioral: Negative.        Objective   Physical Exam  Vitals reviewed.   Constitutional:       General: She is not in acute distress.     Appearance: She is well-developed and overweight. She is not diaphoretic.   HENT:      Head: Normocephalic.      Right Ear: External ear normal.      Left Ear: External ear normal.      Nose: Nose normal.   Eyes:      Pupils: Pupils are equal, round, and reactive to light.   Neck:      Thyroid: No thyromegaly.      Vascular: No JVD.   Cardiovascular:      Rate and Rhythm: Normal rate and regular rhythm.      Heart sounds: No murmur heard.    No  friction rub. No gallop.   Pulmonary:      Effort: Pulmonary effort is normal. No respiratory distress.      Breath sounds: Normal breath sounds. No wheezing or rales.   Musculoskeletal:         General: Normal range of motion.      Cervical back: Normal range of motion and neck supple.   Skin:     General: Skin is warm and dry.      Coloration: Skin is not pale.      Findings: No erythema or rash.   Neurological:      Mental Status: She is alert and oriented to person, place, and time.   Psychiatric:         Behavior: Behavior normal.         Thought Content: Thought content normal.         Judgment: Judgment normal.           Assessment & Plan   Diagnoses and all orders for this visit:    1. Well adult exam (Primary)  -     CBC & Differential  -     Comprehensive Metabolic Panel  -     Hemoglobin A1c  -     Lipid Panel  -     TSH  -     Vitamin D 25 Hydroxy  -     Microalbumin / Creatinine Urine Ratio - Urine, Clean Catch  -     Hepatitis C Antibody  -     Iron Profile  -     Vitamin B12    2. Oral contraceptive use  -     norethindrone (MICRONOR) 0.35 MG tablet; Take 1 tablet by mouth Daily.  Dispense: 84 tablet; Refill: 3    3. Chronic fatigue  -     CBC & Differential  -     Comprehensive Metabolic Panel  -     Hemoglobin A1c  -     Lipid Panel  -     TSH  -     Vitamin D 25 Hydroxy  -     Microalbumin / Creatinine Urine Ratio - Urine, Clean Catch  -     Hepatitis C Antibody  -     Iron Profile  -     Vitamin B12  -     Ambulatory Referral to Sleep Medicine    4. Screening for diabetes mellitus  -     CBC & Differential  -     Comprehensive Metabolic Panel  -     Hemoglobin A1c  -     Lipid Panel  -     TSH  -     Vitamin D 25 Hydroxy  -     Microalbumin / Creatinine Urine Ratio - Urine, Clean Catch  -     Hepatitis C Antibody    5. Screening for lipoid disorders  -     CBC & Differential  -     Comprehensive Metabolic Panel  -     Hemoglobin A1c  -     Lipid Panel  -     TSH  -     Vitamin D 25 Hydroxy  -      Microalbumin / Creatinine Urine Ratio - Urine, Clean Catch  -     Hepatitis C Antibody    6. Screening for thyroid disorder  -     CBC & Differential  -     Comprehensive Metabolic Panel  -     Hemoglobin A1c  -     Lipid Panel  -     TSH  -     Vitamin D 25 Hydroxy  -     Microalbumin / Creatinine Urine Ratio - Urine, Clean Catch  -     Hepatitis C Antibody    7. Encounter for hepatitis C screening test for low risk patient  -     CBC & Differential  -     Comprehensive Metabolic Panel  -     Hemoglobin A1c  -     Lipid Panel  -     TSH  -     Vitamin D 25 Hydroxy  -     Microalbumin / Creatinine Urine Ratio - Urine, Clean Catch  -     Hepatitis C Antibody    8. Snoring  -     Ambulatory Referral to Sleep Medicine    9. Encounter for screening mammogram for breast cancer  -     Mammo Screening Bilateral With CAD; Future    10. Overweight (BMI 25.0-29.9)    Other orders  -     Hydrocortisone Acetate 30 MG suppository; Insert 1 each into the rectum 3 (Three) Times a Day.  Dispense: 12 each; Refill: 11               BMI is >= 25 and <30. (Overweight) The following options were offered after discussion;: weight loss educational material (shared in after visit summary)    Anticipatory guidance provided at discharge.  Fasting labs.  Referral to sleep medicine for evaluation of snoring.  Schedule for screening mammogram.  Continue current medications.  Follow up as scheduled for routine follow up.  Follow up sooner for problems/concerns.  Patient verbalized understanding and agreement with plan of care.        This document has been electronically signed by ALEXA Duval on June 12, 2022 20:15 CDT

## 2022-06-21 ENCOUNTER — LAB (OUTPATIENT)
Dept: LAB | Facility: HOSPITAL | Age: 42
End: 2022-06-21

## 2022-06-21 LAB
25(OH)D3 SERPL-MCNC: 17.5 NG/ML (ref 30–100)
ALBUMIN SERPL-MCNC: 4.5 G/DL (ref 3.5–5.2)
ALBUMIN UR-MCNC: 1.4 MG/DL
ALBUMIN/GLOB SERPL: 1.7 G/DL
ALP SERPL-CCNC: 66 U/L (ref 39–117)
ALT SERPL W P-5'-P-CCNC: 12 U/L (ref 1–33)
ANION GAP SERPL CALCULATED.3IONS-SCNC: 10 MMOL/L (ref 5–15)
AST SERPL-CCNC: 14 U/L (ref 1–32)
BASOPHILS # BLD AUTO: 0.05 10*3/MM3 (ref 0–0.2)
BASOPHILS NFR BLD AUTO: 0.9 % (ref 0–1.5)
BILIRUB SERPL-MCNC: 0.9 MG/DL (ref 0–1.2)
BUN SERPL-MCNC: 13 MG/DL (ref 6–20)
BUN/CREAT SERPL: 16.7 (ref 7–25)
CALCIUM SPEC-SCNC: 9.1 MG/DL (ref 8.6–10.5)
CHLORIDE SERPL-SCNC: 105 MMOL/L (ref 98–107)
CHOLEST SERPL-MCNC: 209 MG/DL (ref 0–200)
CO2 SERPL-SCNC: 26 MMOL/L (ref 22–29)
CREAT SERPL-MCNC: 0.78 MG/DL (ref 0.57–1)
CREAT UR-MCNC: 238.6 MG/DL
DEPRECATED RDW RBC AUTO: 35.8 FL (ref 37–54)
EGFRCR SERPLBLD CKD-EPI 2021: 98.6 ML/MIN/1.73
EOSINOPHIL # BLD AUTO: 0.21 10*3/MM3 (ref 0–0.4)
EOSINOPHIL NFR BLD AUTO: 3.6 % (ref 0.3–6.2)
ERYTHROCYTE [DISTWIDTH] IN BLOOD BY AUTOMATED COUNT: 12.5 % (ref 12.3–15.4)
GLOBULIN UR ELPH-MCNC: 2.7 GM/DL
GLUCOSE SERPL-MCNC: 77 MG/DL (ref 65–99)
HBA1C MFR BLD: 4.6 % (ref 4.8–5.6)
HCT VFR BLD AUTO: 37.6 % (ref 34–46.6)
HCV AB SER DONR QL: NORMAL
HDLC SERPL-MCNC: 70 MG/DL (ref 40–60)
HGB BLD-MCNC: 12.6 G/DL (ref 12–15.9)
IMM GRANULOCYTES # BLD AUTO: 0.02 10*3/MM3 (ref 0–0.05)
IMM GRANULOCYTES NFR BLD AUTO: 0.3 % (ref 0–0.5)
IRON 24H UR-MRATE: 88 MCG/DL (ref 37–145)
IRON SATN MFR SERPL: 23 % (ref 20–50)
LDLC SERPL CALC-MCNC: 131 MG/DL (ref 0–100)
LDLC/HDLC SERPL: 1.85 {RATIO}
LYMPHOCYTES # BLD AUTO: 1.22 10*3/MM3 (ref 0.7–3.1)
LYMPHOCYTES NFR BLD AUTO: 21.1 % (ref 19.6–45.3)
MCH RBC QN AUTO: 27 PG (ref 26.6–33)
MCHC RBC AUTO-ENTMCNC: 33.5 G/DL (ref 31.5–35.7)
MCV RBC AUTO: 80.7 FL (ref 79–97)
MICROALBUMIN/CREAT UR: 5.9 MG/G
MONOCYTES # BLD AUTO: 0.31 10*3/MM3 (ref 0.1–0.9)
MONOCYTES NFR BLD AUTO: 5.4 % (ref 5–12)
NEUTROPHILS NFR BLD AUTO: 3.97 10*3/MM3 (ref 1.7–7)
NEUTROPHILS NFR BLD AUTO: 68.7 % (ref 42.7–76)
NRBC BLD AUTO-RTO: 0 /100 WBC (ref 0–0.2)
PLATELET # BLD AUTO: 293 10*3/MM3 (ref 140–450)
PMV BLD AUTO: 10.5 FL (ref 6–12)
POTASSIUM SERPL-SCNC: 3.8 MMOL/L (ref 3.5–5.2)
PROT SERPL-MCNC: 7.2 G/DL (ref 6–8.5)
RBC # BLD AUTO: 4.66 10*6/MM3 (ref 3.77–5.28)
SODIUM SERPL-SCNC: 141 MMOL/L (ref 136–145)
TIBC SERPL-MCNC: 389 MCG/DL (ref 298–536)
TRANSFERRIN SERPL-MCNC: 261 MG/DL (ref 200–360)
TRIGL SERPL-MCNC: 47 MG/DL (ref 0–150)
TSH SERPL DL<=0.05 MIU/L-ACNC: 1.01 UIU/ML (ref 0.27–4.2)
VIT B12 BLD-MCNC: 303 PG/ML (ref 211–946)
VLDLC SERPL-MCNC: 8 MG/DL (ref 5–40)
WBC NRBC COR # BLD: 5.78 10*3/MM3 (ref 3.4–10.8)

## 2022-06-21 PROCEDURE — 86803 HEPATITIS C AB TEST: CPT | Performed by: NURSE PRACTITIONER

## 2022-06-21 PROCEDURE — 83036 HEMOGLOBIN GLYCOSYLATED A1C: CPT | Performed by: NURSE PRACTITIONER

## 2022-06-21 PROCEDURE — 80061 LIPID PANEL: CPT | Performed by: NURSE PRACTITIONER

## 2022-06-21 PROCEDURE — 82043 UR ALBUMIN QUANTITATIVE: CPT | Performed by: NURSE PRACTITIONER

## 2022-06-21 PROCEDURE — 82570 ASSAY OF URINE CREATININE: CPT | Performed by: NURSE PRACTITIONER

## 2022-06-21 PROCEDURE — 82306 VITAMIN D 25 HYDROXY: CPT | Performed by: NURSE PRACTITIONER

## 2022-06-21 PROCEDURE — 82607 VITAMIN B-12: CPT | Performed by: NURSE PRACTITIONER

## 2022-06-21 PROCEDURE — 84466 ASSAY OF TRANSFERRIN: CPT | Performed by: NURSE PRACTITIONER

## 2022-06-21 PROCEDURE — 83540 ASSAY OF IRON: CPT | Performed by: NURSE PRACTITIONER

## 2022-06-21 PROCEDURE — 80050 GENERAL HEALTH PANEL: CPT | Performed by: NURSE PRACTITIONER

## 2022-06-22 ENCOUNTER — TELEPHONE (OUTPATIENT)
Dept: FAMILY MEDICINE CLINIC | Facility: CLINIC | Age: 42
End: 2022-06-22

## 2022-06-22 NOTE — TELEPHONE ENCOUNTER
----- Message from Mesfin Elizondo LPN sent at 6/22/2022  8:07 AM CDT -----    ----- Message -----  From: Sarah Peraza APRN  Sent: 6/21/2022   8:22 PM CDT  To: Mesfin Elizondo LPN    Total cholesterol and LDL cholesterol levels are elevated.  I would recommend a low-dose cholesterol medication at bedtime to decrease risk of heart attack and stroke if she is open to this.  Normal iron profile.  Normal electrolytes, kidney function, liver function.  Vitamin D is low.  Recommend adding over-the-counter vitamin D3 5000 IU once daily.  Vitamin B12 is on the lower end of normal.  Recommend over-the-counter vitamin B12 once daily.  A1c is low at 4.6.  Make sure she is eating protein every few hours to avoid low blood sugar levels.  Normal thyroid.  Blood counts are okay.

## 2022-06-22 NOTE — TELEPHONE ENCOUNTER
----- Message from Mesfin Elizondo LPN sent at 6/22/2022  8:07 AM CDT -----    ----- Message -----  From: Sarah Peraza APRN  Sent: 6/21/2022   8:22 PM CDT  To: Mesfin Elizondo LPN    Total cholesterol and LDL cholesterol levels are elevated.  I would recommend a low-dose cholesterol medication at bedtime to decrease risk of heart attack and stroke if she is open to this.  Normal iron profile.  Normal electrolytes, kidney function, liver function.  Vitamin D is low.  Recommend adding over-the-counter vitamin D3 5000 IU once daily.  Vitamin B12 is on the lower end of normal.  Recommend over-the-counter vitamin B12 once daily.  A1c is low at 4.6.  Make sure she is eating protein every few hours to avoid low blood sugar levels.  Normal thyroid.  Blood counts are okay.    Pt okay with starting on Cholesterol medication.

## 2022-06-28 RX ORDER — ROSUVASTATIN CALCIUM 10 MG/1
10 TABLET, COATED ORAL NIGHTLY
Qty: 90 TABLET | Refills: 1 | Status: SHIPPED | OUTPATIENT
Start: 2022-06-28

## 2023-05-17 ENCOUNTER — LAB (OUTPATIENT)
Dept: LAB | Facility: HOSPITAL | Age: 43
End: 2023-05-17
Payer: COMMERCIAL

## 2023-05-17 ENCOUNTER — OFFICE VISIT (OUTPATIENT)
Dept: FAMILY MEDICINE CLINIC | Facility: CLINIC | Age: 43
End: 2023-05-17
Payer: COMMERCIAL

## 2023-05-17 VITALS
HEART RATE: 66 BPM | HEIGHT: 63 IN | TEMPERATURE: 98.3 F | SYSTOLIC BLOOD PRESSURE: 138 MMHG | OXYGEN SATURATION: 100 % | DIASTOLIC BLOOD PRESSURE: 100 MMHG | BODY MASS INDEX: 28.88 KG/M2 | WEIGHT: 163 LBS

## 2023-05-17 DIAGNOSIS — F32.A ANXIETY AND DEPRESSION: ICD-10-CM

## 2023-05-17 DIAGNOSIS — F41.9 ANXIETY AND DEPRESSION: ICD-10-CM

## 2023-05-17 DIAGNOSIS — I10 ESSENTIAL HYPERTENSION: Primary | Chronic | ICD-10-CM

## 2023-05-17 LAB
25(OH)D3 SERPL-MCNC: 16.7 NG/ML (ref 30–100)
ALBUMIN SERPL-MCNC: 4.7 G/DL (ref 3.5–5.2)
ALBUMIN UR-MCNC: 4.2 MG/DL
ALBUMIN/GLOB SERPL: 1.6 G/DL
ALP SERPL-CCNC: 70 U/L (ref 39–117)
ALT SERPL W P-5'-P-CCNC: 13 U/L (ref 1–33)
ANION GAP SERPL CALCULATED.3IONS-SCNC: 8 MMOL/L (ref 5–15)
AST SERPL-CCNC: 17 U/L (ref 1–32)
BASOPHILS # BLD AUTO: 0.05 10*3/MM3 (ref 0–0.2)
BASOPHILS NFR BLD AUTO: 0.9 % (ref 0–1.5)
BILIRUB SERPL-MCNC: 0.6 MG/DL (ref 0–1.2)
BUN SERPL-MCNC: 10 MG/DL (ref 6–20)
BUN/CREAT SERPL: 13.2 (ref 7–25)
CALCIUM SPEC-SCNC: 9.1 MG/DL (ref 8.6–10.5)
CHLORIDE SERPL-SCNC: 103 MMOL/L (ref 98–107)
CHOLEST SERPL-MCNC: 218 MG/DL (ref 0–200)
CO2 SERPL-SCNC: 28 MMOL/L (ref 22–29)
CREAT SERPL-MCNC: 0.76 MG/DL (ref 0.57–1)
CREAT UR-MCNC: 165.9 MG/DL
DEPRECATED RDW RBC AUTO: 36.8 FL (ref 37–54)
EGFRCR SERPLBLD CKD-EPI 2021: 101.1 ML/MIN/1.73
EOSINOPHIL # BLD AUTO: 0.08 10*3/MM3 (ref 0–0.4)
EOSINOPHIL NFR BLD AUTO: 1.5 % (ref 0.3–6.2)
ERYTHROCYTE [DISTWIDTH] IN BLOOD BY AUTOMATED COUNT: 12.8 % (ref 12.3–15.4)
GLOBULIN UR ELPH-MCNC: 2.9 GM/DL
GLUCOSE SERPL-MCNC: 77 MG/DL (ref 65–99)
HBA1C MFR BLD: 4.6 % (ref 4.8–5.6)
HCT VFR BLD AUTO: 38.8 % (ref 34–46.6)
HDLC SERPL-MCNC: 69 MG/DL (ref 40–60)
HGB BLD-MCNC: 12.4 G/DL (ref 12–15.9)
IMM GRANULOCYTES # BLD AUTO: 0.01 10*3/MM3 (ref 0–0.05)
IMM GRANULOCYTES NFR BLD AUTO: 0.2 % (ref 0–0.5)
LDLC SERPL CALC-MCNC: 141 MG/DL (ref 0–100)
LDLC/HDLC SERPL: 2.03 {RATIO}
LYMPHOCYTES # BLD AUTO: 1.58 10*3/MM3 (ref 0.7–3.1)
LYMPHOCYTES NFR BLD AUTO: 29.9 % (ref 19.6–45.3)
MCH RBC QN AUTO: 25.8 PG (ref 26.6–33)
MCHC RBC AUTO-ENTMCNC: 32 G/DL (ref 31.5–35.7)
MCV RBC AUTO: 80.8 FL (ref 79–97)
MICROALBUMIN/CREAT UR: 25.3 MG/G
MONOCYTES # BLD AUTO: 0.28 10*3/MM3 (ref 0.1–0.9)
MONOCYTES NFR BLD AUTO: 5.3 % (ref 5–12)
NEUTROPHILS NFR BLD AUTO: 3.28 10*3/MM3 (ref 1.7–7)
NEUTROPHILS NFR BLD AUTO: 62.2 % (ref 42.7–76)
NRBC BLD AUTO-RTO: 0 /100 WBC (ref 0–0.2)
PLATELET # BLD AUTO: 342 10*3/MM3 (ref 140–450)
PMV BLD AUTO: 10.7 FL (ref 6–12)
POTASSIUM SERPL-SCNC: 3.8 MMOL/L (ref 3.5–5.2)
PROT SERPL-MCNC: 7.6 G/DL (ref 6–8.5)
RBC # BLD AUTO: 4.8 10*6/MM3 (ref 3.77–5.28)
SODIUM SERPL-SCNC: 139 MMOL/L (ref 136–145)
TRIGL SERPL-MCNC: 45 MG/DL (ref 0–150)
TSH SERPL DL<=0.05 MIU/L-ACNC: 1.37 UIU/ML (ref 0.27–4.2)
VLDLC SERPL-MCNC: 8 MG/DL (ref 5–40)
WBC NRBC COR # BLD: 5.28 10*3/MM3 (ref 3.4–10.8)

## 2023-05-17 PROCEDURE — 84443 ASSAY THYROID STIM HORMONE: CPT | Performed by: NURSE PRACTITIONER

## 2023-05-17 PROCEDURE — 3080F DIAST BP >= 90 MM HG: CPT | Performed by: NURSE PRACTITIONER

## 2023-05-17 PROCEDURE — 99214 OFFICE O/P EST MOD 30 MIN: CPT | Performed by: NURSE PRACTITIONER

## 2023-05-17 PROCEDURE — 85025 COMPLETE CBC W/AUTO DIFF WBC: CPT | Performed by: NURSE PRACTITIONER

## 2023-05-17 PROCEDURE — 82043 UR ALBUMIN QUANTITATIVE: CPT | Performed by: NURSE PRACTITIONER

## 2023-05-17 PROCEDURE — 3075F SYST BP GE 130 - 139MM HG: CPT | Performed by: NURSE PRACTITIONER

## 2023-05-17 PROCEDURE — 36415 COLL VENOUS BLD VENIPUNCTURE: CPT | Performed by: NURSE PRACTITIONER

## 2023-05-17 PROCEDURE — 1160F RVW MEDS BY RX/DR IN RCRD: CPT | Performed by: NURSE PRACTITIONER

## 2023-05-17 PROCEDURE — 82570 ASSAY OF URINE CREATININE: CPT | Performed by: NURSE PRACTITIONER

## 2023-05-17 PROCEDURE — 80053 COMPREHEN METABOLIC PANEL: CPT | Performed by: NURSE PRACTITIONER

## 2023-05-17 PROCEDURE — 82306 VITAMIN D 25 HYDROXY: CPT | Performed by: NURSE PRACTITIONER

## 2023-05-17 PROCEDURE — 83036 HEMOGLOBIN GLYCOSYLATED A1C: CPT | Performed by: NURSE PRACTITIONER

## 2023-05-17 PROCEDURE — 1159F MED LIST DOCD IN RCRD: CPT | Performed by: NURSE PRACTITIONER

## 2023-05-17 PROCEDURE — 80061 LIPID PANEL: CPT | Performed by: NURSE PRACTITIONER

## 2023-05-17 RX ORDER — LOSARTAN POTASSIUM 25 MG/1
12.5 TABLET ORAL DAILY
Qty: 30 TABLET | Refills: 0 | Status: SHIPPED | OUTPATIENT
Start: 2023-05-17

## 2023-05-17 RX ORDER — ESCITALOPRAM OXALATE 5 MG/1
5 TABLET ORAL DAILY
Qty: 30 TABLET | Refills: 0 | Status: SHIPPED | OUTPATIENT
Start: 2023-05-17

## 2023-05-17 RX ORDER — BUSPIRONE HYDROCHLORIDE 5 MG/1
5 TABLET ORAL 3 TIMES DAILY
Qty: 90 TABLET | Refills: 0 | Status: SHIPPED | OUTPATIENT
Start: 2023-05-17

## 2023-05-17 NOTE — PROGRESS NOTES
"Chief Complaint  Hypertension (Elevated blood pressure since 4/26/23.  Having HA and numbness rt arm.)    Subjective          Blanca Ventura presents to [facility] [place]    History of Present Illness  [facility]    PCP: ALEXA Forde    CC: \"blood pressure\"    Hx of HTN.  Previously on Losartan 12.5 mg daily.  Took herself off about 6 months ago due to BP running better.  Seen at  last month and was told BP was high--160/104.  Does report she has been having some more frequent headaches, tingling in arms and occasional palpitations.  Denies swelling or dyspnea.  Does report that she is willing to restart the Losartan.  Has f/u with PCP end of month.  PCP has order in for screening labs.     Also reports increased stress, anxiety and depression symptoms for the last few months.  Has felt exhausted, wanting to sleep all the time and has noted a decreased libido.  Denies previously being treated for anxiety/depression in the past, but feels she is needing something to assist with this at this time and also willing to see a counselor.  Denies any HI/SI or AVH.          Review of Systems   Constitutional: Positive for fatigue. Negative for appetite change and fever. Unexpected weight change:  down 4 pounds from a year ago.   Respiratory: Negative.    Cardiovascular: Positive for palpitations (occasional). Negative for chest pain and leg swelling.   Gastrointestinal: Negative.    Genitourinary: Negative.    Musculoskeletal: Negative.    Skin: Negative.    Neurological: Positive for headaches. Negative for dizziness, speech difficulty, weakness and light-headedness.        +tingling in arms at times     Psychiatric/Behavioral: Positive for decreased concentration and dysphoric mood. Negative for hallucinations, self-injury and suicidal ideas. Sleep disturbance:  sleeping more. The patient is nervous/anxious. The patient is not hyperactive.     " "    Objective   Vital Signs:   /100 (BP Location: Right arm, Patient Position: Sitting, Cuff Size: Adult)   Pulse 66   Temp 98.3 °F (36.8 °C) (Oral)   Ht 160 cm (63\")   Wt 73.9 kg (163 lb)   SpO2 100%   BMI 28.87 kg/m²       Physical Exam  Vitals and nursing note reviewed.   Constitutional:       General: She is not in acute distress.     Appearance: She is not ill-appearing.   HENT:      Head: Normocephalic and atraumatic.   Cardiovascular:      Rate and Rhythm: Normal rate and regular rhythm.   Pulmonary:      Effort: Pulmonary effort is normal. No respiratory distress.      Breath sounds: Normal breath sounds. No wheezing, rhonchi or rales.   Musculoskeletal:      Cervical back: Neck supple. No tenderness.      Right lower leg: No edema.      Left lower leg: No edema.   Lymphadenopathy:      Cervical: No cervical adenopathy.   Neurological:      General: No focal deficit present.      Mental Status: She is alert and oriented to person, place, and time.   Psychiatric:         Attention and Perception: Attention normal.         Mood and Affect: Affect is tearful.         Speech: Speech normal.         Behavior: Behavior is cooperative.         Thought Content: Thought content normal.         Cognition and Memory: Cognition normal.          Result Review :     Common labs        6/21/2022    08:57   Common Labs   Glucose 77     BUN 13     Creatinine 0.78     Sodium 141     Potassium 3.8     Chloride 105     Calcium 9.1     Albumin 4.50     Total Bilirubin 0.9     Alkaline Phosphatase 66     AST (SGOT) 14     ALT (SGPT) 12     WBC 5.78     Hemoglobin 12.6     Hematocrit 37.6     Platelets 293     Total Cholesterol 209     Triglycerides 47     HDL Cholesterol 70     LDL Cholesterol  131     Hemoglobin A1C 4.60     Microalbumin, Urine 1.4       TSH        6/21/2022    08:57   TSH   TSH 1.010                 Assessment and Plan    Diagnoses and all orders for this visit:    1. Essential hypertension " (Primary)  -     losartan (COZAAR) 25 MG tablet; Take 0.5 tablets by mouth Daily.  Dispense: 30 tablet; Refill: 0    2. Anxiety and depression  -     escitalopram (Lexapro) 5 MG tablet; Take 1 tablet by mouth Daily.  Dispense: 30 tablet; Refill: 0  -     busPIRone (BUSPAR) 5 MG tablet; Take 1 tablet by mouth 3 (Three) Times a Day.  Dispense: 90 tablet; Refill: 0  -     Ambulatory Referral to Behavioral Health      Recommended she restart Losartan at previous dose of 12.5 mg daily--new Rx given    Recommended she begin Lexapro 5 mg daily with Buspar PRN--Rx given--discussion regarding side effects/not stopping abruptly with patient  Referral to behavioral health for counseling    Will keep screening labs as ordered by PCP and keep scheduled f/u on 5- for recheck of above problems    Return to Same Day Clinic for any acute problems/concerns      This document has been electronically signed by ALEXA Mendez on May 17, 2023 10:21 CDT,.

## 2023-05-26 ENCOUNTER — OFFICE VISIT (OUTPATIENT)
Dept: FAMILY MEDICINE CLINIC | Facility: CLINIC | Age: 43
End: 2023-05-26
Payer: COMMERCIAL

## 2023-05-26 VITALS
WEIGHT: 162.5 LBS | HEIGHT: 63 IN | DIASTOLIC BLOOD PRESSURE: 72 MMHG | OXYGEN SATURATION: 98 % | TEMPERATURE: 98.9 F | BODY MASS INDEX: 28.79 KG/M2 | RESPIRATION RATE: 20 BRPM | SYSTOLIC BLOOD PRESSURE: 120 MMHG | HEART RATE: 76 BPM

## 2023-05-26 DIAGNOSIS — Z30.41 ORAL CONTRACEPTIVE USE: ICD-10-CM

## 2023-05-26 DIAGNOSIS — Z13.29 SCREENING FOR THYROID DISORDER: ICD-10-CM

## 2023-05-26 DIAGNOSIS — I10 ESSENTIAL HYPERTENSION: ICD-10-CM

## 2023-05-26 DIAGNOSIS — E55.9 VITAMIN D DEFICIENCY: ICD-10-CM

## 2023-05-26 DIAGNOSIS — E78.5 DYSLIPIDEMIA: ICD-10-CM

## 2023-05-26 DIAGNOSIS — Z13.220 SCREENING FOR LIPOID DISORDERS: ICD-10-CM

## 2023-05-26 DIAGNOSIS — Z13.1 SCREENING FOR DIABETES MELLITUS: ICD-10-CM

## 2023-05-26 DIAGNOSIS — Z00.00 WELL ADULT EXAM: Primary | ICD-10-CM

## 2023-05-26 PROCEDURE — 3074F SYST BP LT 130 MM HG: CPT | Performed by: NURSE PRACTITIONER

## 2023-05-26 PROCEDURE — 99396 PREV VISIT EST AGE 40-64: CPT | Performed by: NURSE PRACTITIONER

## 2023-05-26 PROCEDURE — 1160F RVW MEDS BY RX/DR IN RCRD: CPT | Performed by: NURSE PRACTITIONER

## 2023-05-26 PROCEDURE — 3078F DIAST BP <80 MM HG: CPT | Performed by: NURSE PRACTITIONER

## 2023-05-26 PROCEDURE — 1159F MED LIST DOCD IN RCRD: CPT | Performed by: NURSE PRACTITIONER

## 2023-05-26 RX ORDER — LOSARTAN POTASSIUM 25 MG/1
12.5 TABLET ORAL DAILY
Qty: 90 TABLET | Refills: 3 | Status: SHIPPED | OUTPATIENT
Start: 2023-05-26

## 2023-05-26 RX ORDER — ROSUVASTATIN CALCIUM 10 MG/1
10 TABLET, COATED ORAL NIGHTLY
Qty: 90 TABLET | Refills: 3 | Status: SHIPPED | OUTPATIENT
Start: 2023-05-26

## 2023-05-26 RX ORDER — ERGOCALCIFEROL 1.25 MG/1
50000 CAPSULE ORAL WEEKLY
Qty: 12 CAPSULE | Refills: 3 | Status: SHIPPED | OUTPATIENT
Start: 2023-05-26

## 2023-05-26 RX ORDER — ACETAMINOPHEN AND CODEINE PHOSPHATE 120; 12 MG/5ML; MG/5ML
1 SOLUTION ORAL DAILY
Qty: 84 TABLET | Refills: 3 | Status: SHIPPED | OUTPATIENT
Start: 2023-05-26

## 2023-05-26 NOTE — PATIENT INSTRUCTIONS
"Hypertension, Adult  High blood pressure (hypertension) is when the force of blood pumping through the arteries is too strong. The arteries are the blood vessels that carry blood from the heart throughout the body. Hypertension forces the heart to work harder to pump blood and may cause arteries to become narrow or stiff. Untreated or uncontrolled hypertension can cause a heart attack, heart failure, a stroke, kidney disease, and other problems.  A blood pressure reading consists of a higher number over a lower number. Ideally, your blood pressure should be below 120/80. The first (\"top\") number is called the systolic pressure. It is a measure of the pressure in your arteries as your heart beats. The second (\"bottom\") number is called the diastolic pressure. It is a measure of the pressure in your arteries as the heart relaxes.  What are the causes?  The exact cause of this condition is not known. There are some conditions that result in or are related to high blood pressure.  What increases the risk?  Some risk factors for high blood pressure are under your control. The following factors may make you more likely to develop this condition:  Smoking.  Having type 2 diabetes mellitus, high cholesterol, or both.  Not getting enough exercise or physical activity.  Being overweight.  Having too much fat, sugar, calories, or salt (sodium) in your diet.  Drinking too much alcohol.  Some risk factors for high blood pressure may be difficult or impossible to change. Some of these factors include:  Having chronic kidney disease.  Having a family history of high blood pressure.  Age. Risk increases with age.  Race. You may be at higher risk if you are .  Gender. Men are at higher risk than women before age 45. After age 65, women are at higher risk than men.  Having obstructive sleep apnea.  Stress.  What are the signs or symptoms?  High blood pressure may not cause symptoms. Very high blood pressure " (hypertensive crisis) may cause:  Headache.  Anxiety.  Shortness of breath.  Nosebleed.  Nausea and vomiting.  Vision changes.  Severe chest pain.  Seizures.  How is this diagnosed?  This condition is diagnosed by measuring your blood pressure while you are seated, with your arm resting on a flat surface, your legs uncrossed, and your feet flat on the floor. The cuff of the blood pressure monitor will be placed directly against the skin of your upper arm at the level of your heart. It should be measured at least twice using the same arm. Certain conditions can cause a difference in blood pressure between your right and left arms.  Certain factors can cause blood pressure readings to be lower or higher than normal for a short period of time:  When your blood pressure is higher when you are in a health care provider's office than when you are at home, this is called white coat hypertension. Most people with this condition do not need medicines.  When your blood pressure is higher at home than when you are in a health care provider's office, this is called masked hypertension. Most people with this condition may need medicines to control blood pressure.  If you have a high blood pressure reading during one visit or you have normal blood pressure with other risk factors, you may be asked to:  Return on a different day to have your blood pressure checked again.  Monitor your blood pressure at home for 1 week or longer.  If you are diagnosed with hypertension, you may have other blood or imaging tests to help your health care provider understand your overall risk for other conditions.  How is this treated?  This condition is treated by making healthy lifestyle changes, such as eating healthy foods, exercising more, and reducing your alcohol intake. Your health care provider may prescribe medicine if lifestyle changes are not enough to get your blood pressure under control, and if:  Your systolic blood pressure is above  130.  Your diastolic blood pressure is above 80.  Your personal target blood pressure may vary depending on your medical conditions, your age, and other factors.  Follow these instructions at home:  Eating and drinking    Eat a diet that is high in fiber and potassium, and low in sodium, added sugar, and fat. An example eating plan is called the DASH (Dietary Approaches to Stop Hypertension) diet. To eat this way:  Eat plenty of fresh fruits and vegetables. Try to fill one half of your plate at each meal with fruits and vegetables.  Eat whole grains, such as whole-wheat pasta, brown rice, or whole-grain bread. Fill about one fourth of your plate with whole grains.  Eat or drink low-fat dairy products, such as skim milk or low-fat yogurt.  Avoid fatty cuts of meat, processed or cured meats, and poultry with skin. Fill about one fourth of your plate with lean proteins, such as fish, chicken without skin, beans, eggs, or tofu.  Avoid pre-made and processed foods. These tend to be higher in sodium, added sugar, and fat.  Reduce your daily sodium intake. Most people with hypertension should eat less than 1,500 mg of sodium a day.  Do not drink alcohol if:  Your health care provider tells you not to drink.  You are pregnant, may be pregnant, or are planning to become pregnant.  If you drink alcohol:  Limit how much you use to:  0-1 drink a day for women.  0-2 drinks a day for men.  Be aware of how much alcohol is in your drink. In the [place] one drink equals one 12 oz bottle of beer (355 mL), one 5 oz glass of wine (148 mL), or one 1½ oz glass of hard liquor (44 mL).  Lifestyle    Work with your health care provider to maintain a healthy body weight or to lose weight. Ask what an ideal weight is for you.  Get at least 30 minutes of exercise most days of the week. Activities may include walking, swimming, or biking.  Include exercise to strengthen your muscles (resistance exercise), such as Pilates or lifting weights, as  part of your weekly exercise routine. Try to do these types of exercises for 30 minutes at least 3 days a week.  Do not use any products that contain nicotine or tobacco, such as cigarettes, e-cigarettes, and chewing tobacco. If you need help quitting, ask your health care provider.  Monitor your blood pressure at home as told by your health care provider.  Keep all follow-up visits as told by your health care provider. This is important.  Medicines  Take over-the-counter and prescription medicines only as told by your health care provider. Follow directions carefully. Blood pressure medicines must be taken as prescribed.  Do not skip doses of blood pressure medicine. Doing this puts you at risk for problems and can make the medicine less effective.  Ask your health care provider about side effects or reactions to medicines that you should watch for.  Contact a health care provider if you:  Think you are having a reaction to a medicine you are taking.  Have headaches that keep coming back (recurring).  Feel dizzy.  Have swelling in your ankles.  Have trouble with your vision.  Get help right away if you:  Develop a severe headache or confusion.  Have unusual weakness or numbness.  Feel faint.  Have severe pain in your chest or abdomen.  Vomit repeatedly.  Have trouble breathing.  Summary  Hypertension is when the force of blood pumping through your arteries is too strong. If this condition is not controlled, it may put you at risk for serious complications.  Your personal target blood pressure may vary depending on your medical conditions, your age, and other factors. For most people, a normal blood pressure is less than 120/80.  Hypertension is treated with lifestyle changes, medicines, or a combination of both. Lifestyle changes include losing weight, eating a healthy, low-sodium diet, exercising more, and limiting alcohol.  This information is not intended to replace advice given to you by your health care  provider. Make sure you discuss any questions you have with your health care provider.  Document Revised: 08/28/2019 Document Reviewed: 08/28/2019  [employer] Patient Education © 2022 [employer]  Managing Your Hypertension  Hypertension, also called high blood pressure, is when the force of the blood pressing against the walls of the arteries is too strong. Arteries are blood vessels that carry blood from your heart throughout your body. Hypertension forces the heart to work harder to pump blood and may cause the arteries to become narrow or stiff.  Understanding blood pressure readings  A blood pressure reading includes a higher number over a lower number:  The first, or top, number is called the systolic pressure. It is a measure of the pressure in your arteries as your heart beats.  The second, or bottom number, is called the diastolic pressure. It is a measure of the pressure in your arteries as the heart relaxes.  For most people, a normal blood pressure is below 120/80. Your personal target blood pressure may vary depending on your medical conditions, your age, and other factors.  Blood pressure is classified into four stages. Based on your blood pressure reading, your health care provider may use the following stages to determine what type of treatment you need, if any. Systolic pressure and diastolic pressure are measured in a unit called millimeters of mercury (mmHg).  Normal  Systolic pressure: below 120.  Diastolic pressure: below 80.  Elevated  Systolic pressure: 120-129.  Diastolic pressure: below 80.  Hypertension stage 1  Systolic pressure: 130-139.  Diastolic pressure: 80-89.  Hypertension stage 2  Systolic pressure: 140 or above.  Diastolic pressure: 90 or above.  How can this condition affect me?  Managing your hypertension is very important. Over time, hypertension can damage the arteries and decrease blood flow to parts of the body, including the brain, heart, and kidneys. Having untreated or  uncontrolled hypertension can lead to:  A heart attack.  A stroke.  A weakened blood vessel (aneurysm).  Heart failure.  Kidney damage.  Eye damage.  Memory and concentration problems.  Vascular dementia.  What actions can I take to manage this condition?  Hypertension can be managed by making lifestyle changes and possibly by taking medicines. Your health care provider will help you make a plan to bring your blood pressure within a normal range. You may be referred for counseling on a healthy diet and physical activity.  Nutrition    Eat a diet that is high in fiber and potassium, and low in salt (sodium), added sugar, and fat. An example eating plan is called the DASH diet. DASH stands for Dietary Approaches to Stop Hypertension. To eat this way:  Eat plenty of fresh fruits and vegetables. Try to fill one-half of your plate at each meal with fruits and vegetables.  Eat whole grains, such as whole-wheat pasta, brown rice, or whole-grain bread. Fill about one-fourth of your plate with whole grains.  Eat low-fat dairy products.  Avoid fatty cuts of meat, processed or cured meats, and poultry with skin. Fill about one-fourth of your plate with lean proteins such as fish, chicken without skin, beans, eggs, and tofu.  Avoid pre-made and processed foods. These tend to be higher in sodium, added sugar, and fat.  Reduce your daily sodium intake. Many people with hypertension should eat less than 1,500 mg of sodium a day.  Lifestyle    Work with your health care provider to maintain a healthy body weight or to lose weight. Ask what an ideal weight is for you.  Get at least 30 minutes of exercise that causes your heart to beat faster (aerobic exercise) most days of the week. Activities may include walking, swimming, or biking.  Include exercise to strengthen your muscles (resistance exercise), such as weight lifting, as part of your weekly exercise routine. Try to do these types of exercises for 30 minutes at least 3 days a  week.  Do not use any products that contain nicotine or tobacco. These products include cigarettes, chewing tobacco, and vaping devices, such as e-cigarettes. If you need help quitting, ask your health care provider.  Control any long-term (chronic) conditions you have, such as high cholesterol or diabetes.  Identify your sources of stress and find ways to manage stress. This may include meditation, deep breathing, or making time for fun activities.  Alcohol use  Do not drink alcohol if:  Your health care provider tells you not to drink.  You are pregnant, may be pregnant, or are planning to become pregnant.  If you drink alcohol:  Limit how much you have to:  0-1 drink a day for women.  0-2 drinks a day for men.  Know how much alcohol is in your drink. In the [place] one drink equals one 12 oz bottle of beer (355 mL), one 5 oz glass of wine (148 mL), or one 1½ oz glass of hard liquor (44 mL).  Medicines  Your health care provider may prescribe medicine if lifestyle changes are not enough to get your blood pressure under control and if:  Your systolic blood pressure is 130 or higher.  Your diastolic blood pressure is 80 or higher.  Take medicines only as told by your health care provider. Follow the directions carefully. Blood pressure medicines must be taken as told by your health care provider. The medicine does not work as well when you skip doses. Skipping doses also puts you at risk for problems.  Monitoring  Before you monitor your blood pressure:  Do not smoke, drink caffeinated beverages, or exercise within 30 minutes before taking a measurement.  Use the bathroom and empty your bladder (urinate).  Sit quietly for at least 5 minutes before taking measurements.  Monitor your blood pressure at home as told by your health care provider. To do this:  Sit with your back straight and supported.  Place your feet flat on the floor. Do not cross your legs.  Support your arm on a flat surface, such as a table. Make sure  your upper arm is at heart level.  Each time you measure, take two or three readings one minute apart and record the results.  You may also need to have your blood pressure checked regularly by your health care provider.  General information  Talk with your health care provider about your diet, exercise habits, and other lifestyle factors that may be contributing to hypertension.  Review all the medicines you take with your health care provider because there may be side effects or interactions.  Keep all follow-up visits. Your health care provider can help you create and adjust your plan for managing your high blood pressure.  Where to find more information  [employer], [employer], [employer] [place]: www.nhlbi.nih.gov  American Heart Association: www.heart.org  Contact a health care provider if:  You think you are having a reaction to medicines you have taken.  You have repeated (recurrent) headaches.  You feel dizzy.  You have swelling in your ankles.  You have trouble with your vision.  Get help right away if:  You develop a severe headache or confusion.  You have unusual weakness or numbness, or you feel faint.  You have severe pain in your chest or abdomen.  You vomit repeatedly.  You have trouble breathing.  These symptoms may be an emergency. Get help right away. Call 911.  Do not wait to see if the symptoms will go away.  Do not drive yourself to the hospital.  Summary  Hypertension is when the force of blood pumping through your arteries is too strong. If this condition is not controlled, it may put you at risk for serious complications.  Your personal target blood pressure may vary depending on your medical conditions, your age, and other factors. For most people, a normal blood pressure is less than 120/80.  Hypertension is managed by lifestyle changes, medicines, or both.  Lifestyle changes to help manage hypertension include losing weight, eating a healthy, low-sodium diet, exercising more, stopping  smoking, and limiting alcohol.  This information is not intended to replace advice given to you by your health care provider. Make sure you discuss any questions you have with your health care provider.  Document Revised: 09/01/2022 Document Reviewed: 09/01/2022  [employer] Patient Education © 2022 [employer]

## 2023-06-02 ENCOUNTER — TELEPHONE (OUTPATIENT)
Dept: FAMILY MEDICINE CLINIC | Facility: CLINIC | Age: 43
End: 2023-06-02
Payer: COMMERCIAL

## 2023-06-02 NOTE — TELEPHONE ENCOUNTER
----- Message from Sarah Peraza, RODO, APRN sent at 5/23/2023  4:16 PM CDT -----  Cholesterol levels are not well controlled.  Has she been taking her cholesterol medication without missing doses?  Vitamin D is low.  Has she been taking her vitamin D supplement without missing doses?  A1c is low at 4.6.  Recommend increasing protein in her diet to avoid low blood sugar levels.  Blood counts are okay.  Normal thyroid.  Normal electrolytes, kidney function, liver function.

## 2023-06-13 NOTE — PROGRESS NOTES
Subjective   Blanca Ventura is a 42 y.o. female.     History of Present Illness  She presents today for her routine follow-up on chronic medical problems and annual wellness visit.  Her blood pressure is better controlled since she started back taking her blood pressure medication.  She reports that she tolerated her oral contraceptive without side effect.  She does need a refill on her cholesterol medication.  She does feel like the anxiety medication helps when she needs it.  She is tolerating her vitamin D supplement without side effect.  Her BMI is currently 28.8%.  She is due for routine fasting labs.  She is otherwise without any other new complaints today in the office.     The following portions of the patient's history were reviewed and updated as appropriate: allergies, current medications, past family history, past medical history, past social history, past surgical history and problem list.    Review of Systems   Constitutional: Negative.    HENT: Negative.     Eyes: Negative.    Respiratory: Negative.     Cardiovascular: Negative.    Gastrointestinal: Negative.    Musculoskeletal: Negative.    Skin: Negative.    Allergic/Immunologic: Negative.    Neurological: Negative.    Hematological: Negative.    Psychiatric/Behavioral: Negative.       Objective   Physical Exam  Vitals reviewed.   Constitutional:       General: She is not in acute distress.     Appearance: She is well-developed and overweight. She is not diaphoretic.   HENT:      Head: Normocephalic.      Right Ear: External ear normal.      Left Ear: External ear normal.      Nose: Nose normal.   Eyes:      Pupils: Pupils are equal, round, and reactive to light.   Neck:      Thyroid: No thyromegaly.      Vascular: No JVD.   Cardiovascular:      Rate and Rhythm: Normal rate and regular rhythm.      Heart sounds: No murmur heard.    No friction rub. No gallop.   Pulmonary:      Effort: Pulmonary effort is normal. No respiratory distress.       Breath sounds: Normal breath sounds. No wheezing or rales.   Musculoskeletal:         General: Normal range of motion.      Cervical back: Normal range of motion and neck supple.   Skin:     General: Skin is warm and dry.      Coloration: Skin is not pale.      Findings: No erythema or rash.   Neurological:      Mental Status: She is alert and oriented to person, place, and time.   Psychiatric:         Behavior: Behavior normal.         Thought Content: Thought content normal.         Judgment: Judgment normal.       Assessment & Plan   Diagnoses and all orders for this visit:    1. Well adult exam (Primary)  -     CBC & Differential  -     Comprehensive Metabolic Panel  -     Hemoglobin A1c  -     Lipid Panel  -     TSH  -     Vitamin D,25-Hydroxy  -     Microalbumin / Creatinine Urine Ratio - Urine, Clean Catch    2. Essential hypertension  -     CBC & Differential  -     Comprehensive Metabolic Panel  -     Hemoglobin A1c  -     Lipid Panel  -     TSH  -     Vitamin D,25-Hydroxy  -     Microalbumin / Creatinine Urine Ratio - Urine, Clean Catch  -     losartan (COZAAR) 25 MG tablet; Take 0.5 tablets by mouth Daily.  Dispense: 90 tablet; Refill: 3    3. Screening for diabetes mellitus  -     CBC & Differential  -     Comprehensive Metabolic Panel  -     Hemoglobin A1c  -     Lipid Panel  -     TSH  -     Vitamin D,25-Hydroxy  -     Microalbumin / Creatinine Urine Ratio - Urine, Clean Catch    4. Screening for lipoid disorders  -     CBC & Differential  -     Comprehensive Metabolic Panel  -     Hemoglobin A1c  -     Lipid Panel  -     TSH  -     Vitamin D,25-Hydroxy  -     Microalbumin / Creatinine Urine Ratio - Urine, Clean Catch    5. Screening for thyroid disorder  -     CBC & Differential  -     Comprehensive Metabolic Panel  -     Hemoglobin A1c  -     Lipid Panel  -     TSH  -     Vitamin D,25-Hydroxy  -     Microalbumin / Creatinine Urine Ratio - Urine, Clean Catch    6. Vitamin D deficiency  -     CBC &  Differential  -     Comprehensive Metabolic Panel  -     Hemoglobin A1c  -     Lipid Panel  -     TSH  -     Vitamin D,25-Hydroxy  -     Microalbumin / Creatinine Urine Ratio - Urine, Clean Catch  -     vitamin D (ERGOCALCIFEROL) 1.25 MG (51300 UT) capsule capsule; Take 1 capsule by mouth 1 (One) Time Per Week.  Dispense: 12 capsule; Refill: 3    7. Dyslipidemia  -     rosuvastatin (Crestor) 10 MG tablet; Take 1 tablet by mouth Every Night.  Dispense: 90 tablet; Refill: 3    8. Oral contraceptive use  -     norethindrone (MICRONOR) 0.35 MG tablet; Take 1 tablet by mouth Daily.  Dispense: 84 tablet; Refill: 3               BMI is >= 25 and <30. (Overweight) The following options were offered after discussion;: weight loss educational material (shared in after visit summary)    Anticipatory guidance provided at discharge.  Fasting labs.  Continue current medications.  Follow up as scheduled for routine follow up.  Follow up sooner for problems/concerns.  Patient verbalized understanding and agreement with plan of care.        This document has been electronically signed by Sarah Peraza DNP, APRN on June 13, 2023 10:56 CDT